# Patient Record
Sex: FEMALE | Race: WHITE | NOT HISPANIC OR LATINO
[De-identification: names, ages, dates, MRNs, and addresses within clinical notes are randomized per-mention and may not be internally consistent; named-entity substitution may affect disease eponyms.]

---

## 2022-07-15 ENCOUNTER — TRANSCRIPTION ENCOUNTER (OUTPATIENT)
Age: 37
End: 2022-07-15

## 2022-07-15 ENCOUNTER — APPOINTMENT (OUTPATIENT)
Dept: GASTROENTEROLOGY | Facility: CLINIC | Age: 37
End: 2022-07-15
Payer: COMMERCIAL

## 2022-07-15 VITALS
SYSTOLIC BLOOD PRESSURE: 116 MMHG | OXYGEN SATURATION: 98 % | HEART RATE: 80 BPM | RESPIRATION RATE: 16 BRPM | TEMPERATURE: 97.8 F | DIASTOLIC BLOOD PRESSURE: 84 MMHG

## 2022-07-15 VITALS — HEIGHT: 64 IN | BODY MASS INDEX: 37.22 KG/M2 | WEIGHT: 218 LBS

## 2022-07-15 DIAGNOSIS — Z80.8 FAMILY HISTORY OF MALIGNANT NEOPLASM OF OTHER ORGANS OR SYSTEMS: ICD-10-CM

## 2022-07-15 DIAGNOSIS — Z78.9 OTHER SPECIFIED HEALTH STATUS: ICD-10-CM

## 2022-07-15 DIAGNOSIS — Z80.0 FAMILY HISTORY OF MALIGNANT NEOPLASM OF DIGESTIVE ORGANS: ICD-10-CM

## 2022-07-15 PROCEDURE — 99213 OFFICE O/P EST LOW 20 MIN: CPT

## 2022-07-15 PROCEDURE — 99203 OFFICE O/P NEW LOW 30 MIN: CPT

## 2022-07-15 NOTE — HISTORY OF PRESENT ILLNESS
[de-identified] : 37-year-old woman history of ulcerative colitis patient of mine at [facility] here to establish care.  Her son is a little over a-year-old, she had some difficulty after his delivery which might have been a form of postpartum depression.  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.\par \par UC: L sided colitis\par Meds failed:remicade\par Current meds:humira q2w\par Last date administered:7/4/22\par Last level checked:\par Last colonoscopy:11/21; small amounts of tissue but no active inflammation\par Last imaging:\par \par Surgery:no\par \par BM/D:1\par BM/night:0\par Blood:no\par Mucus:yes\par Weight loss:no; increase weight; struggling\par 221 when pregnant \par lowest 206 \par spring stress\par Noom\par exercising - [employer] classes\par joints hurt less\par Fatigue:\par \par Joint aches:less\par Skin issues:\par Eye issues:\par \par TB:\par HBV:\par \par summer routine\par mommy and me\par

## 2022-07-15 NOTE — ASSESSMENT
[FreeTextEntry1] : 37-year-old woman history of ulcerative colitis well managed on Humira every 2 weeks here to establish care

## 2022-09-08 ENCOUNTER — TRANSCRIPTION ENCOUNTER (OUTPATIENT)
Age: 37
End: 2022-09-08

## 2022-10-18 ENCOUNTER — TRANSCRIPTION ENCOUNTER (OUTPATIENT)
Age: 37
End: 2022-10-18

## 2022-10-20 ENCOUNTER — TRANSCRIPTION ENCOUNTER (OUTPATIENT)
Age: 37
End: 2022-10-20

## 2022-10-21 ENCOUNTER — TRANSCRIPTION ENCOUNTER (OUTPATIENT)
Age: 37
End: 2022-10-21

## 2022-11-15 ENCOUNTER — APPOINTMENT (OUTPATIENT)
Dept: GASTROENTEROLOGY | Facility: CLINIC | Age: 37
End: 2022-11-15

## 2022-11-15 VITALS
HEIGHT: 64 IN | BODY MASS INDEX: 37.9 KG/M2 | RESPIRATION RATE: 16 BRPM | HEART RATE: 71 BPM | SYSTOLIC BLOOD PRESSURE: 112 MMHG | TEMPERATURE: 98.7 F | OXYGEN SATURATION: 98 % | DIASTOLIC BLOOD PRESSURE: 76 MMHG | WEIGHT: 222 LBS

## 2022-11-15 PROCEDURE — 99214 OFFICE O/P EST MOD 30 MIN: CPT

## 2022-11-15 NOTE — ASSESSMENT
[FreeTextEntry1] : 37-year-old lady history of mild postpartum depression and left-sided ulcerative colitis well managed on Humira every other week here for follow-up.

## 2022-11-15 NOTE — HISTORY OF PRESENT ILLNESS
[FreeTextEntry1] : 37-year-old lady history of left-sided colitis here in follow-up.\par \par Initial dx:1/2010\par UC: L sided colitis\par Meds failed:remicade - DILI after 6 weeks and still sxatic\par Current meds:humira q2w (8/2013-)\par issues with meds\par Last date administered:11/13/22 92 weeks since last one, but there was a delay with the one prior)\par Last level checked: a long time ago\par Last colonoscopy:11/21; small amounts of tissue but no active inflammation\par Last imaging:a while\par \par Surgery:no\par TB:n/a, ordered today\par HBV:n/a, ordered today\par \par BM/D: 1-2\par BM/night:0\par Blood: no\par Mucus: yes\par Weight loss: no\par Fatigue: ok\par Joint aches: felt it the week she was late with the injection (knees)\par Skin issues: eczema ? see if varies with Humira; full body check in Oct\par Eye issues:due in Feb\par \par Blood work mid August 2022\par Iron saturation 14%, ferritin 21\par Cholesterol profile normal\par Normal CMP\par Hemoglobin 12.8, MCV 89\par ESR 9 normal less than 20; CRP 4.1 normal less than 8\par Vitamin D 46 normal greater than 30\par \par Last office visit date:\par \par Complaints at last office visit:\par \par Plan at last office visit:\par \par Follow-up after plan at last office visit:\par \par Chart events since last OV:\par

## 2022-11-16 RX ORDER — ADALIMUMAB 40MG/0.8ML
40 KIT SUBCUTANEOUS
Qty: 6 | Refills: 4 | Status: DISCONTINUED | COMMUNITY
Start: 2022-11-15 | End: 2022-11-16

## 2022-12-01 ENCOUNTER — TRANSCRIPTION ENCOUNTER (OUTPATIENT)
Age: 37
End: 2022-12-01

## 2023-05-23 ENCOUNTER — APPOINTMENT (OUTPATIENT)
Dept: GASTROENTEROLOGY | Facility: CLINIC | Age: 38
End: 2023-05-23
Payer: COMMERCIAL

## 2023-05-23 VITALS
OXYGEN SATURATION: 98 % | HEIGHT: 64 IN | WEIGHT: 219 LBS | SYSTOLIC BLOOD PRESSURE: 124 MMHG | DIASTOLIC BLOOD PRESSURE: 72 MMHG | TEMPERATURE: 97.2 F | BODY MASS INDEX: 37.39 KG/M2 | RESPIRATION RATE: 16 BRPM | HEART RATE: 69 BPM

## 2023-05-23 PROCEDURE — 99214 OFFICE O/P EST MOD 30 MIN: CPT

## 2023-06-22 ENCOUNTER — TRANSCRIPTION ENCOUNTER (OUTPATIENT)
Age: 38
End: 2023-06-22

## 2023-07-11 ENCOUNTER — TRANSCRIPTION ENCOUNTER (OUTPATIENT)
Age: 38
End: 2023-07-11

## 2023-07-12 ENCOUNTER — APPOINTMENT (OUTPATIENT)
Dept: GASTROENTEROLOGY | Facility: GI CENTER | Age: 38
End: 2023-07-12
Payer: COMMERCIAL

## 2023-07-12 ENCOUNTER — RESULT REVIEW (OUTPATIENT)
Age: 38
End: 2023-07-12

## 2023-07-12 ENCOUNTER — OUTPATIENT (OUTPATIENT)
Dept: OUTPATIENT SERVICES | Facility: HOSPITAL | Age: 38
LOS: 1 days | End: 2023-07-12
Payer: COMMERCIAL

## 2023-07-12 DIAGNOSIS — K51.90 ULCERATIVE COLITIS, UNSPECIFIED, WITHOUT COMPLICATIONS: ICD-10-CM

## 2023-07-12 PROCEDURE — 45380 COLONOSCOPY AND BIOPSY: CPT

## 2023-07-12 PROCEDURE — 88305 TISSUE EXAM BY PATHOLOGIST: CPT

## 2023-07-12 PROCEDURE — 88305 TISSUE EXAM BY PATHOLOGIST: CPT | Mod: 26

## 2023-07-12 NOTE — HISTORY OF PRESENT ILLNESS
[FreeTextEntry1] : 38-year-old lady history of ulcerative proctitis in follow-up.\par \par Initial dx:1/2010\par UC: L sided colitis\par Meds failed:remicade - DILI after 6 weeks and still sxatic\par Current meds:humira q2w (8/2013-)\par Last level checked: a long time ago\par Last colonoscopy:11/21; small amounts of tissue but no active inflammation (poor prep)\par Last imaging:a while\par \par Surgery:no\par TB:Negative on May 2023\par HBV: Negative on May 2023\par \par May 2023 Labs:\par White count 12.1, hemoglobin normal at 13.7, MCV normal at 88.5, platelets at 265\par ESR 6 CRP 0.7 <8 nl\par \par BM/D: all over the place\par 0-4\par not diarrhea, struggles with constipation; daily fiber--if she is consistent usually ok\par cycle - different story\par BM/night:0\par Blood: no; only if v constipated (surface)\par Mucus: occ\par Weight loss: stable\par Fatigue: ok\par Joint aches: ok\par Skin issues: eczema - occ when due for Humira\par Eye issues:went to ophtho thick corneas no glaucoma\par \par \par \par

## 2023-07-12 NOTE — ASSESSMENT
[FreeTextEntry1] : 38 F UC on Humira here for surveillance colon. 5/23 labs Hb 13.7, ESR 6, CRP 0.7, no calpro. \par \par The patient is medically optimized for procedure(s). All questions have been answered. The risks and benefits of the procedure have been discussed and the patient signed consent for the procedure. Preliminary results will be discussed when the patient wakes up from anesthesia, but definitive results will be discussed after the pathology report is issued in 5 business days.\par

## 2023-07-12 NOTE — ASSESSMENT
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

## 2023-07-12 NOTE — HISTORY OF PRESENT ILLNESS
[FreeTextEntry1] : 38 F UC on Humira here for surveillance colon. 5/23 labs Hb 13.7, ESR 6, CRP 0.7, no calpro.

## 2023-07-19 LAB — SURGICAL PATHOLOGY STUDY: SIGNIFICANT CHANGE UP

## 2023-07-23 ENCOUNTER — TRANSCRIPTION ENCOUNTER (OUTPATIENT)
Age: 38
End: 2023-07-23

## 2023-10-25 RX ORDER — ADALIMUMAB 40MG/0.4ML
40 KIT SUBCUTANEOUS
Qty: 3 | Refills: 4 | Status: ACTIVE | COMMUNITY
Start: 2022-10-19

## 2023-10-27 ENCOUNTER — TRANSCRIPTION ENCOUNTER (OUTPATIENT)
Age: 38
End: 2023-10-27

## 2023-11-01 ENCOUNTER — TRANSCRIPTION ENCOUNTER (OUTPATIENT)
Age: 38
End: 2023-11-01

## 2023-11-02 ENCOUNTER — TRANSCRIPTION ENCOUNTER (OUTPATIENT)
Age: 38
End: 2023-11-02

## 2023-11-08 ENCOUNTER — TRANSCRIPTION ENCOUNTER (OUTPATIENT)
Age: 38
End: 2023-11-08

## 2023-11-28 ENCOUNTER — TRANSCRIPTION ENCOUNTER (OUTPATIENT)
Age: 38
End: 2023-11-28

## 2023-11-28 ENCOUNTER — RX RENEWAL (OUTPATIENT)
Age: 38
End: 2023-11-28

## 2024-01-22 ENCOUNTER — TRANSCRIPTION ENCOUNTER (OUTPATIENT)
Age: 39
End: 2024-01-22

## 2024-01-22 RX ORDER — ADALIMUMAB 40MG/0.4ML
40 KIT SUBCUTANEOUS
Qty: 3 | Refills: 3 | Status: ACTIVE | COMMUNITY
Start: 2022-11-16 | End: 1900-01-01

## 2024-03-04 ENCOUNTER — APPOINTMENT (OUTPATIENT)
Dept: GASTROENTEROLOGY | Facility: CLINIC | Age: 39
End: 2024-03-04
Payer: COMMERCIAL

## 2024-03-04 VITALS
HEIGHT: 64 IN | SYSTOLIC BLOOD PRESSURE: 127 MMHG | BODY MASS INDEX: 38.41 KG/M2 | TEMPERATURE: 98 F | OXYGEN SATURATION: 97 % | RESPIRATION RATE: 14 BRPM | HEART RATE: 71 BPM | WEIGHT: 225 LBS | DIASTOLIC BLOOD PRESSURE: 87 MMHG

## 2024-03-04 DIAGNOSIS — K51.90 ULCERATIVE COLITIS, UNSPECIFIED, W/OUT COMPLICATIONS: ICD-10-CM

## 2024-03-04 DIAGNOSIS — Z09 ENCOUNTER FOR FOLLOW-UP EXAMINATION AFTER COMPLETED TREATMENT FOR CONDITIONS OTHER THAN MALIGNANT NEOPLASM: ICD-10-CM

## 2024-03-04 PROCEDURE — 99214 OFFICE O/P EST MOD 30 MIN: CPT

## 2024-03-04 RX ORDER — ZOSTER VACCINE RECOMBINANT, ADJUVANTED 50 MCG/0.5
50 KIT INTRAMUSCULAR
Qty: 2 | Refills: 0 | Status: ACTIVE | COMMUNITY
Start: 2024-03-04 | End: 1900-01-01

## 2024-03-04 RX ORDER — ATOGEPANT 60 MG/1
60 TABLET ORAL
Refills: 0 | Status: ACTIVE | COMMUNITY

## 2024-03-04 RX ORDER — SODIUM SULFATE, MAGNESIUM SULFATE, AND POTASSIUM CHLORIDE 17.75; 2.7; 2.25 G/1; G/1; G/1
1479-225-188 TABLET ORAL
Qty: 24 | Refills: 0 | Status: DISCONTINUED | COMMUNITY
Start: 2023-05-23 | End: 2024-03-04

## 2024-03-07 ENCOUNTER — TRANSCRIPTION ENCOUNTER (OUTPATIENT)
Age: 39
End: 2024-03-07

## 2024-03-07 NOTE — PLAN
[TextEntry] : 1. labs; would be good to get Humira level, this has been expensive in the past, up to pt to decide if can go through this again but before biosimilar switch would be a good time to have levels 2. f/u over the summer 3. pt getting switched to Humira biosimilar, told her this can't be contested and my patients who went through this with [facility] transitioned smoothly - she will get back to me with spf biosimilar she is asked to switch to 4. last imaging at [facility], will review records for details and update this note with this info

## 2024-03-07 NOTE — HISTORY OF PRESENT ILLNESS
[FreeTextEntry1] : 38-year-old lady history of left-sided ulcerative colitis, also DILI from Remicade doing well on Humira here in follow-up.  Initial dx:1/2010  UC: L sided colitis  Meds failed:remicade - DILI after 6 weeks   Current meds:humira q2w (8/2013-)  Last level checked: a long time ago  Last colonoscopy: July 23, 2023 Two pseudopolyps at 35 cm, removed using cold forceps bx. There was mild  proctitis, 4 cold forceps bx obtained. There was no evidence of disease activity  anywhere else, incl TI. Four cold forceps bx obtained segmentally in TI,  ascending, transverse, descending and 2 in sigmoid. .    Small internal hemorrhoids, not bleeding, seen on retroflexion. . pecimen(s) Submitted 1  Biopsy TI 2  Biopsy ascending 3  Biopsy transverse 4  Biopsy descending 5  Polyp @ 35 cm 6  Biopsy sigmoid 7  Biopsy rectum   Final Diagnosis 1. Terminal ileum, biopsy: -    Small bowel mucosa, unremarkable  2. Ascending colon, biopsy: -   Benign colonic mucosa with lymphoid aggregate  3. Transverse colon, biopsy: -  Benign colonic mucosa with lymphoid aggregate  4. Descending colon, biopsy: -   Benign colonic mucosa with lymphoid aggregate  5. Polyp at 35cm, biopsy: -  Polypoid colonic mucosa with benign lymphoid aggregate Note: Levels were examined  6. Sigmoid colon, biopsy: -  Benign colonic mucosa with lymphoid aggregate  7. Rectum, biopsy: -   Benign colonic mucosa with focal acute inflammatory infiltrate in lamina propria and lymphoid aggregate  11/21; small amounts of tissue but no active inflammation (poor prep)  Last imaging MRE 2019 pancreas divisum, no active inflammation    Surgery:no  TB:Negative on May 2023  HBV: Negative on May 2023  May 2023 Labs:  White count 12.1, hemoglobin normal at 13.7, MCV normal at 88.5, platelets at 265  ESR 6 CRP 0.7 <8 nl  BM/D: 1 (fluctuates with period)  not diarrhea, struggles with constipation; daily fiber--if she is consistent usually ok  BM/night:0  Blood: no; only if v constipated (surface)  Mucus: occ  Weight loss: stable  Fatigue: ok  Joint aches: ok  Skin issues: eczema - occ when due for Humira  Eye issues:went to ophtho thick corneas no glaucoma  2 migraines/mo prior Qlipta 3 HA/wk

## 2024-03-07 NOTE — ASSESSMENT
[FreeTextEntry1] : 38-year-old lady history of left-sided ulcerative colitis, also DILI from Remicade doing well on Humira here in follow-up.

## 2024-03-20 ENCOUNTER — TRANSCRIPTION ENCOUNTER (OUTPATIENT)
Age: 39
End: 2024-03-20

## 2024-03-21 ENCOUNTER — TRANSCRIPTION ENCOUNTER (OUTPATIENT)
Age: 39
End: 2024-03-21

## 2024-04-16 ENCOUNTER — TRANSCRIPTION ENCOUNTER (OUTPATIENT)
Age: 39
End: 2024-04-16

## 2024-04-17 ENCOUNTER — TRANSCRIPTION ENCOUNTER (OUTPATIENT)
Age: 39
End: 2024-04-17

## 2024-04-17 RX ORDER — ADALIMUMAB-ADAZ 40 MG/.4ML
40 INJECTION, SOLUTION SUBCUTANEOUS
Qty: 2 | Refills: 12 | Status: ACTIVE | COMMUNITY
Start: 2024-04-16 | End: 1900-01-01

## 2024-07-22 ENCOUNTER — TRANSCRIPTION ENCOUNTER (OUTPATIENT)
Age: 39
End: 2024-07-22

## 2024-08-15 ENCOUNTER — APPOINTMENT (OUTPATIENT)
Dept: GASTROENTEROLOGY | Facility: CLINIC | Age: 39
End: 2024-08-15
Payer: COMMERCIAL

## 2024-08-15 VITALS
DIASTOLIC BLOOD PRESSURE: 92 MMHG | RESPIRATION RATE: 14 BRPM | SYSTOLIC BLOOD PRESSURE: 140 MMHG | BODY MASS INDEX: 37.56 KG/M2 | OXYGEN SATURATION: 96 % | HEART RATE: 74 BPM | WEIGHT: 220 LBS | HEIGHT: 64 IN

## 2024-08-15 PROCEDURE — 99214 OFFICE O/P EST MOD 30 MIN: CPT

## 2024-08-15 NOTE — HISTORY OF PRESENT ILLNESS
[FreeTextEntry1] : 39-year-old lady history of left-sided ulcerative colitis, also DILI from Remicade doing well on Humira here in follow-up.  Last OV march 2024 plan: 1. labs; would be good to get Humira level, this has been expensive in the past, up to pt to decide if can go through this again but before biosimilar switch would be a good time to have levels 2. f/u over the summer 3. pt getting switched to Humira biosimilar, told her this can't be contested and my patients who went through this with [facility] transitioned smoothly - she will get back to me with spf biosimilar she is asked to switch to 4. last imaging at [facility], will review records for details and update this note with this info  Initial dx:1/2010  UC: L sided colitis  Meds failed:remicade - DILI after 6 weeks  Current meds:humira q2w (8/2013-) q2w  Last level checked: a long time ago  Last colonoscopy: July 23, 2023 Two pseudopolyps at 35 cm, removed using cold forceps bx. There was mild  proctitis, 4 cold forceps bx obtained. There was no evidence of disease activity  anywhere else, incl TI. Four cold forceps bx obtained segmentally in TI,  ascending, transverse, descending and 2 in sigmoid. .    Small internal hemorrhoids, not bleeding, seen on retroflexion. . pecimen(s) Submitted 1 Biopsy TI 2 Biopsy ascending 3 Biopsy transverse 4 Biopsy descending 5 Polyp @ 35 cm 6 Biopsy sigmoid 7 Biopsy rectum   Final Diagnosis 1. Terminal ileum, biopsy: - Small bowel mucosa, unremarkable  2. Ascending colon, biopsy: - Benign colonic mucosa with lymphoid aggregate  3. Transverse colon, biopsy: - Benign colonic mucosa with lymphoid aggregate  4. Descending colon, biopsy: - Benign colonic mucosa with lymphoid aggregate  5. Polyp at 35cm, biopsy: - Polypoid colonic mucosa with benign lymphoid aggregate Note: Levels were examined  6. Sigmoid colon, biopsy: - Benign colonic mucosa with lymphoid aggregate  7. Rectum, biopsy: - Benign colonic mucosa with focal acute inflammatory infiltrate in lamina propria and lymphoid aggregate  11/21; small amounts of tissue but no active inflammation (poor prep)  Last imaging MRE 2019 pancreas divisum, no active inflammation    Surgery:no  TB:Negative on May 2023  HBV: Negative on May 2023  May 2023 Labs:  White count 12.1, hemoglobin normal at 13.7, MCV normal at 88.5, platelets at 265  ESR 6 CRP 0.7 <8 nl  BM/D: 1 (fluctuates with period)  not diarrhea, struggles with constipation; daily fiber--if she is consistent usually ok  BM/night:0  Blood: no; only if v constipated (surface)  Mucus: occ  Weight loss: stable; 5 lb loss since March  Fatigue: ok  Joint aches: ok  Skin issues: eczema better more recently; derm due in Oct-Nov  Eye issues:recent ophtho all good  2 migraines/mo prior Qlipta--started March 2024 3 HA/wk but no migrianes anymore  Mom 2nd TAVR-- CHF, [place]

## 2024-08-15 NOTE — ASSESSMENT
[FreeTextEntry1] : 39-year-old lady history of left-sided ulcerative colitis, also DILI from Remicade doing well on Humira here in follow-up.

## 2024-08-16 ENCOUNTER — TRANSCRIPTION ENCOUNTER (OUTPATIENT)
Age: 39
End: 2024-08-16

## 2024-08-16 DIAGNOSIS — K51.90 ULCERATIVE COLITIS, UNSPECIFIED, W/OUT COMPLICATIONS: ICD-10-CM

## 2024-08-20 ENCOUNTER — TRANSCRIPTION ENCOUNTER (OUTPATIENT)
Age: 39
End: 2024-08-20

## 2024-08-21 ENCOUNTER — APPOINTMENT (OUTPATIENT)
Dept: COLORECTAL SURGERY | Facility: CLINIC | Age: 39
End: 2024-08-21

## 2024-08-23 ENCOUNTER — TRANSCRIPTION ENCOUNTER (OUTPATIENT)
Age: 39
End: 2024-08-23

## 2024-08-29 ENCOUNTER — TRANSCRIPTION ENCOUNTER (OUTPATIENT)
Age: 39
End: 2024-08-29

## 2024-08-30 ENCOUNTER — TRANSCRIPTION ENCOUNTER (OUTPATIENT)
Age: 39
End: 2024-08-30

## 2024-09-25 ENCOUNTER — APPOINTMENT (OUTPATIENT)
Dept: COLORECTAL SURGERY | Facility: CLINIC | Age: 39
End: 2024-09-25

## 2024-12-10 ENCOUNTER — TRANSCRIPTION ENCOUNTER (OUTPATIENT)
Age: 39
End: 2024-12-10

## 2024-12-10 DIAGNOSIS — K51.90 ULCERATIVE COLITIS, UNSPECIFIED, W/OUT COMPLICATIONS: ICD-10-CM

## 2024-12-13 ENCOUNTER — TRANSCRIPTION ENCOUNTER (OUTPATIENT)
Age: 39
End: 2024-12-13

## 2024-12-16 ENCOUNTER — TRANSCRIPTION ENCOUNTER (OUTPATIENT)
Age: 39
End: 2024-12-16

## 2024-12-21 ENCOUNTER — NON-APPOINTMENT (OUTPATIENT)
Age: 39
End: 2024-12-21

## 2024-12-23 ENCOUNTER — TRANSCRIPTION ENCOUNTER (OUTPATIENT)
Age: 39
End: 2024-12-23

## 2025-01-10 ENCOUNTER — APPOINTMENT (OUTPATIENT)
Dept: GASTROENTEROLOGY | Facility: CLINIC | Age: 40
End: 2025-01-10
Payer: COMMERCIAL

## 2025-01-10 DIAGNOSIS — K51.90 ULCERATIVE COLITIS, UNSPECIFIED, W/OUT COMPLICATIONS: ICD-10-CM

## 2025-01-10 PROCEDURE — 99214 OFFICE O/P EST MOD 30 MIN: CPT | Mod: 95

## 2025-01-30 ENCOUNTER — TRANSCRIPTION ENCOUNTER (OUTPATIENT)
Age: 40
End: 2025-01-30

## 2025-02-03 ENCOUNTER — TRANSCRIPTION ENCOUNTER (OUTPATIENT)
Age: 40
End: 2025-02-03

## 2025-02-03 DIAGNOSIS — K51.90 ULCERATIVE COLITIS, UNSPECIFIED, W/OUT COMPLICATIONS: ICD-10-CM

## 2025-02-12 ENCOUNTER — TRANSCRIPTION ENCOUNTER (OUTPATIENT)
Age: 40
End: 2025-02-12

## 2025-02-15 ENCOUNTER — TRANSCRIPTION ENCOUNTER (OUTPATIENT)
Age: 40
End: 2025-02-15

## 2025-02-20 ENCOUNTER — TRANSCRIPTION ENCOUNTER (OUTPATIENT)
Age: 40
End: 2025-02-20

## 2025-02-21 ENCOUNTER — APPOINTMENT (OUTPATIENT)
Dept: MRI IMAGING | Facility: CLINIC | Age: 40
End: 2025-02-21

## 2025-02-21 ENCOUNTER — OUTPATIENT (OUTPATIENT)
Dept: OUTPATIENT SERVICES | Facility: HOSPITAL | Age: 40
LOS: 1 days | End: 2025-02-21
Payer: COMMERCIAL

## 2025-02-21 DIAGNOSIS — K51.90 ULCERATIVE COLITIS, UNSPECIFIED, WITHOUT COMPLICATIONS: ICD-10-CM

## 2025-02-21 PROCEDURE — A9585: CPT

## 2025-02-21 PROCEDURE — 74183 MRI ABD W/O CNTR FLWD CNTR: CPT | Mod: 26

## 2025-02-21 PROCEDURE — 74183 MRI ABD W/O CNTR FLWD CNTR: CPT

## 2025-02-21 PROCEDURE — 72197 MRI PELVIS W/O & W/DYE: CPT

## 2025-02-21 PROCEDURE — 72197 MRI PELVIS W/O & W/DYE: CPT | Mod: 26

## 2025-02-26 ENCOUNTER — TRANSCRIPTION ENCOUNTER (OUTPATIENT)
Age: 40
End: 2025-02-26

## 2025-04-16 ENCOUNTER — APPOINTMENT (OUTPATIENT)
Dept: GASTROENTEROLOGY | Facility: CLINIC | Age: 40
End: 2025-04-16
Payer: COMMERCIAL

## 2025-04-16 VITALS
SYSTOLIC BLOOD PRESSURE: 141 MMHG | OXYGEN SATURATION: 98 % | WEIGHT: 215 LBS | RESPIRATION RATE: 14 BRPM | HEART RATE: 87 BPM | HEIGHT: 64 IN | DIASTOLIC BLOOD PRESSURE: 82 MMHG | BODY MASS INDEX: 36.7 KG/M2

## 2025-04-16 DIAGNOSIS — K51.90 ULCERATIVE COLITIS, UNSPECIFIED, W/OUT COMPLICATIONS: ICD-10-CM

## 2025-04-16 PROCEDURE — 99214 OFFICE O/P EST MOD 30 MIN: CPT

## 2025-04-16 PROCEDURE — G2211 COMPLEX E/M VISIT ADD ON: CPT | Mod: NC

## 2025-04-20 LAB
HBV CORE IGM SER QL: NONREACTIVE
HBV SURFACE AB SER QL: NONREACTIVE
HBV SURFACE AG SER QL: NONREACTIVE
M TB IFN-G BLD-IMP: NEGATIVE
QUANTIFERON TB PLUS MITOGEN MINUS NIL: >10 IU/ML
QUANTIFERON TB PLUS NIL: 0.03 IU/ML
QUANTIFERON TB PLUS TB1 MINUS NIL: 0 IU/ML
QUANTIFERON TB PLUS TB2 MINUS NIL: 0 IU/ML

## 2025-05-28 ENCOUNTER — TRANSCRIPTION ENCOUNTER (OUTPATIENT)
Age: 40
End: 2025-05-28

## (undated) DEVICE — FORCEP ENDOJAW AGTR LC W NDL 2.8MM 230CM DISP

## (undated) DEVICE — STERIS DEFENDO 3-PIECE KIT (AIR/WATER, SUCTION & BIOPSY VALVES)